# Patient Record
Sex: FEMALE | Race: OTHER
[De-identification: names, ages, dates, MRNs, and addresses within clinical notes are randomized per-mention and may not be internally consistent; named-entity substitution may affect disease eponyms.]

---

## 2018-01-10 ENCOUNTER — HOSPITAL ENCOUNTER (EMERGENCY)
Dept: HOSPITAL 74 - FER | Age: 23
Discharge: HOME | End: 2018-01-10
Payer: COMMERCIAL

## 2018-01-10 VITALS — HEART RATE: 62 BPM | SYSTOLIC BLOOD PRESSURE: 139 MMHG | TEMPERATURE: 98.2 F | DIASTOLIC BLOOD PRESSURE: 80 MMHG

## 2018-01-10 VITALS — BODY MASS INDEX: 25 KG/M2

## 2018-01-10 DIAGNOSIS — A08.4: Primary | ICD-10-CM

## 2018-01-10 LAB
ALBUMIN SERPL-MCNC: 4.4 G/DL (ref 3.5–5)
ALP SERPL-CCNC: 46 U/L (ref 32–92)
ALT SERPL-CCNC: 15 U/L (ref 10–40)
ANION GAP SERPL CALC-SCNC: 10 MMOL/L (ref 8–16)
AST SERPL-CCNC: 16 U/L (ref 10–42)
BILIRUB SERPL-MCNC: 0.8 MG/DL (ref 0.2–1)
BUN SERPL-MCNC: 15 MG/DL (ref 7–18)
CALCIUM SERPL-MCNC: 9.6 MG/DL (ref 8.4–10.2)
CHLORIDE SERPL-SCNC: 105 MMOL/L (ref 98–107)
CO2 SERPL-SCNC: 24 MMOL/L (ref 22–28)
COLOR UR: YELLOW
CREAT SERPL-MCNC: 0.7 MG/DL (ref 0.6–1.3)
DEPRECATED RDW RBC AUTO: 12 % (ref 11.6–15.6)
GLUCOSE SERPL-MCNC: 100 MG/DL (ref 74–106)
HCG UR QL: NEGATIVE
HCT VFR BLD CALC: 44.2 % (ref 32.4–45.2)
HGB BLD-MCNC: 14.4 GM/DL (ref 10.7–15.3)
KETONES UR QL STRIP: (no result)
LIPASE SERPL-CCNC: 23 U/L (ref 22–51)
MCH RBC QN AUTO: 29.5 PG (ref 25.7–33.7)
MCHC RBC AUTO-ENTMCNC: 32.5 G/DL (ref 32–36)
MCV RBC: 90.8 FL (ref 80–96)
PH UR: 7.5 [PH] (ref 4.5–8)
PLATELET # BLD AUTO: 216 K/MM3 (ref 134–434)
PLATELET BLD QL SMEAR: ADEQUATE
PMV BLD: 10.2 FL (ref 7.5–11.1)
POTASSIUM SERPLBLD-SCNC: 4.1 MMOL/L (ref 3.5–5.1)
PROT SERPL-MCNC: 7 G/DL (ref 6.4–8.3)
RBC # BLD AUTO: 4.87 M/MM3 (ref 3.6–5.2)
SODIUM SERPL-SCNC: 139 MMOL/L (ref 136–145)
SP GR UR: 1.02 (ref 1–1.02)
UROBILINOGEN UR STRIP-MCNC: 0.2 MG/DL (ref 0.2–1)
WBC # BLD AUTO: 10.3 K/MM3 (ref 4–10.8)

## 2018-01-10 PROCEDURE — 3E0337Z INTRODUCTION OF ELECTROLYTIC AND WATER BALANCE SUBSTANCE INTO PERIPHERAL VEIN, PERCUTANEOUS APPROACH: ICD-10-PCS

## 2018-01-10 PROCEDURE — 3E033GC INTRODUCTION OF OTHER THERAPEUTIC SUBSTANCE INTO PERIPHERAL VEIN, PERCUTANEOUS APPROACH: ICD-10-PCS

## 2018-01-10 NOTE — PDOC
History of Present Illness





- General


Chief Complaint: Nausea/Vomiting


Stated Complaint: VOMITING


Time Seen by Provider: 01/10/18 09:14





- History of Present Illness


Initial Comments: 





01/10/18 10:15


Chief complaint: Nausea and vomiting





History of present illness: Awoke at 3 AM with nausea, vomited 3, clear fluid. 

One episode of loose stool. Mild nausea persists but no further vomiting. 

Family member with similar gastrointestinal illness. No suspicious ingestions. 

Menses regular, denies possibility of pregnancy





Review of systems: As above. In addition, denies fever/chills, headache, URI 

symptoms, sore throat, cough, chest pain, shortness of breath, abdominal pain, 

visual or focal neurologic symptoms, unsteadiness of gait. Remainder systems 

reviewed and found to be negative





Past medical history: Healthy female, no significant medical or surgical 

problems past her present, no GI disease or gastritis surgery





Social/family history reviewed and noncontributory





Physical exam: Alert and oriented cheerful and cooperative no acute distress.


Afebrile, vital signs normal


No pallor or icterus. PERRLA, fundi benign, ENT clear


Neck supple without bruit mass or nodes


Lungs clear with full breath sounds throughout bilaterally. No wheezes rales or 

rhonchi


CV S1 and S2 normal without murmur rub or gallop pulses full and symmetric no 

JVD or edema


Abdomen nondistended. Bowel sounds normal. Soft without masses tenderness 

organomegaly. No CVAT


Extremities no CCE


Skin clear, no rash, adequate turgor and wet mucous membranes


Neurological intact





Impression: Probable viral gastroenteritis, mild to moderate, rule out occult 

pregnancy





Plan: Urinalysis and hCG, CBC and chemistries, IV fluids and Zofran. 

Observation and monitoring.





Past History





- Past Medical History


Allergies/Adverse Reactions: 


 Allergies











Allergy/AdvReac Type Severity Reaction Status Date / Time


 


No Known Allergies Allergy   Verified 01/10/18 08:55











Home Medications: 


Ambulatory Orders





Ondansetron [Zofran Odt -] 4 mg SL TID PRN #15 od.tablet 01/10/18 











ED Treatment Course





- LABORATORY


CBC & Chemistry Diagram: 


 01/10/18 09:38





 01/10/18 09:38





Medical Decision Making





- Medical Decision Making





01/10/18 10:57


No further nausea vomiting or pain. Taking by mouth fluids. Discharged with 

mother in no distress to follow-up as recommended.





*DC/Admit/Observation/Transfer


Diagnosis at time of Disposition: 


 Viral gastroenteritis








- Discharge Dispostion


Disposition: HOME


Condition at time of disposition: Improved


Admit: No





- Prescriptions


Prescriptions: 


Ondansetron [Zofran Odt -] 4 mg SL TID PRN #15 od.tablet


 PRN Reason: Nausea And/Or Vomiting





- Referrals





- Patient Instructions


Printed Discharge Instructions:  DI for Nausea -- Adult, DI for Vomiting -- 

Adult





- Post Discharge Activity